# Patient Record
Sex: FEMALE | Race: WHITE | NOT HISPANIC OR LATINO | Employment: OTHER | URBAN - NONMETROPOLITAN AREA
[De-identification: names, ages, dates, MRNs, and addresses within clinical notes are randomized per-mention and may not be internally consistent; named-entity substitution may affect disease eponyms.]

---

## 2023-11-03 ENCOUNTER — TELEPHONE (OUTPATIENT)
Dept: CARDIOLOGY | Facility: CLINIC | Age: 83
End: 2023-11-03
Payer: MEDICARE

## 2023-11-03 PROBLEM — I51.7 LEFT ATRIAL DILATATION: Status: ACTIVE | Noted: 2023-11-03

## 2023-11-03 PROBLEM — I48.0 PAROXYSMAL ATRIAL FIBRILLATION (MULTI): Status: ACTIVE | Noted: 2023-11-03

## 2023-11-03 PROBLEM — I27.20 PULMONARY HYPERTENSION (MULTI): Status: ACTIVE | Noted: 2023-11-03

## 2023-11-03 PROBLEM — I10 ESSENTIAL HYPERTENSION: Status: ACTIVE | Noted: 2023-11-03

## 2023-11-03 PROBLEM — K74.60 LIVER CIRRHOSIS (MULTI): Status: ACTIVE | Noted: 2023-11-03

## 2023-11-03 PROBLEM — N18.32 STAGE 3B CHRONIC KIDNEY DISEASE (MULTI): Status: ACTIVE | Noted: 2023-11-03

## 2023-11-03 PROBLEM — K64.8 INTERNAL HEMORRHOID: Status: ACTIVE | Noted: 2023-11-03

## 2023-11-03 PROBLEM — D64.9 ANEMIA: Status: ACTIVE | Noted: 2023-11-03

## 2023-11-03 RX ORDER — DILTIAZEM HYDROCHLORIDE 120 MG/1
120 CAPSULE, COATED, EXTENDED RELEASE ORAL DAILY
COMMUNITY
Start: 2023-08-08

## 2023-11-03 RX ORDER — FUROSEMIDE 20 MG/1
1 TABLET ORAL DAILY
COMMUNITY
End: 2024-04-01 | Stop reason: SDUPTHER

## 2023-11-03 RX ORDER — DEXTROMETHORPHAN HYDROBROMIDE, GUAIFENESIN 5; 100 MG/5ML; MG/5ML
650 LIQUID ORAL AS NEEDED
COMMUNITY
Start: 2021-11-05

## 2023-11-03 RX ORDER — POTASSIUM CHLORIDE 750 MG/1
1 CAPSULE, EXTENDED RELEASE ORAL DAILY
COMMUNITY
Start: 2022-02-20 | End: 2024-04-01 | Stop reason: SDUPTHER

## 2023-11-03 RX ORDER — CALCIUM CARBONATE/VITAMIN D3 600MG-5MCG
1 TABLET ORAL 2 TIMES DAILY
COMMUNITY
Start: 2021-11-05

## 2023-11-03 RX ORDER — OMEPRAZOLE 20 MG/1
TABLET, DELAYED RELEASE ORAL
COMMUNITY
Start: 2021-11-05

## 2023-11-03 RX ORDER — DILTIAZEM HYDROCHLORIDE 180 MG/1
CAPSULE, COATED, EXTENDED RELEASE ORAL
COMMUNITY
Start: 2023-10-31 | End: 2024-02-22

## 2023-11-03 NOTE — TELEPHONE ENCOUNTER
Patient phoned in left message stating was having elevated bp and pcp changed medication to 180. She states that its to much and doesn't want to run into a problem like last time. Phoned patient went to , tried to leave a  phone disconnected. Phoned patient back went to  left  to return call.

## 2023-11-17 NOTE — TELEPHONE ENCOUNTER
Patient contacted. States her family doctor Dr. Bill increased the Cardizem CD to 180 mg for her blood pressure. States she just started the increase today.    She will monitor blood pressure and call in one week with update.

## 2024-02-22 ENCOUNTER — OFFICE VISIT (OUTPATIENT)
Dept: CARDIOLOGY | Facility: CLINIC | Age: 84
End: 2024-02-22
Payer: MEDICARE

## 2024-02-22 VITALS
DIASTOLIC BLOOD PRESSURE: 50 MMHG | HEART RATE: 95 BPM | HEIGHT: 59 IN | BODY MASS INDEX: 24.8 KG/M2 | SYSTOLIC BLOOD PRESSURE: 142 MMHG | WEIGHT: 123 LBS

## 2024-02-22 DIAGNOSIS — I10 ESSENTIAL HYPERTENSION: ICD-10-CM

## 2024-02-22 DIAGNOSIS — I51.7 LEFT ATRIAL DILATATION: ICD-10-CM

## 2024-02-22 DIAGNOSIS — I48.0 PAROXYSMAL ATRIAL FIBRILLATION (MULTI): ICD-10-CM

## 2024-02-22 DIAGNOSIS — R06.02 SHORTNESS OF BREATH: ICD-10-CM

## 2024-02-22 DIAGNOSIS — N18.32 STAGE 3B CHRONIC KIDNEY DISEASE (MULTI): ICD-10-CM

## 2024-02-22 DIAGNOSIS — I27.20 PULMONARY HYPERTENSION (MULTI): ICD-10-CM

## 2024-02-22 DIAGNOSIS — I49.3 PVC (PREMATURE VENTRICULAR CONTRACTION): ICD-10-CM

## 2024-02-22 LAB
Lab: PRESENT
NON-UH HIE AGAP: 11 MEQ/L (ref 6–16)
NON-UH HIE ANISOCYTOSIS: PRESENT
NON-UH HIE BUN/CREAT RATIO: 18 NO UNITS (ref 10–20)
NON-UH HIE BUN: 16 MG/DL (ref 5–21)
NON-UH HIE CALCIUM LVL: 9.3 MG/DL (ref 8.9–11.1)
NON-UH HIE CHLORIDE: 107 MMOL/L (ref 101–111)
NON-UH HIE CO2: 24 MMOL/L (ref 21–31)
NON-UH HIE CREATININE: 0.9 MG/DL (ref 0.5–1.3)
NON-UH HIE EGFR: 62 ML/MIN/1.73 M2
NON-UH HIE GLUCOSE LVL: 129 MG/DL (ref 55–199)
NON-UH HIE HCT: 25 % (ref 34–46)
NON-UH HIE HGB: 7.6 GM/DL (ref 12–16)
NON-UH HIE HYPOCHROMASIA: PRESENT
NON-UH HIE MCH: 21.1 PG (ref 27–34)
NON-UH HIE MCHC: 30.7 GM/DL (ref 31.4–36)
NON-UH HIE MCV: 68.5 FL (ref 80–100)
NON-UH HIE MPV: 6.9 FL (ref 6.4–10.8)
NON-UH HIE PLATELET: 358 E9/L (ref 150–500)
NON-UH HIE POTASSIUM LVL: 3.7 MMOL/L (ref 3.5–5.3)
NON-UH HIE RBC MORPH: NORMAL
NON-UH HIE RBC: 3.6 E12/L (ref 4.3–5.9)
NON-UH HIE RDW: 16.4 % (ref 10.9–14.2)
NON-UH HIE SODIUM LVL: 138 MMOL/L (ref 135–145)
NON-UH HIE WBC: 5.1 E9/L (ref 4–11)

## 2024-02-22 PROCEDURE — 1159F MED LIST DOCD IN RCRD: CPT | Performed by: INTERNAL MEDICINE

## 2024-02-22 PROCEDURE — 93000 ELECTROCARDIOGRAM COMPLETE: CPT | Performed by: INTERNAL MEDICINE

## 2024-02-22 PROCEDURE — 1036F TOBACCO NON-USER: CPT | Performed by: INTERNAL MEDICINE

## 2024-02-22 PROCEDURE — 3078F DIAST BP <80 MM HG: CPT | Performed by: INTERNAL MEDICINE

## 2024-02-22 PROCEDURE — 3077F SYST BP >= 140 MM HG: CPT | Performed by: INTERNAL MEDICINE

## 2024-02-22 PROCEDURE — 99214 OFFICE O/P EST MOD 30 MIN: CPT | Performed by: INTERNAL MEDICINE

## 2024-02-22 PROCEDURE — 1126F AMNT PAIN NOTED NONE PRSNT: CPT | Performed by: INTERNAL MEDICINE

## 2024-02-22 ASSESSMENT — ENCOUNTER SYMPTOMS
SHORTNESS OF BREATH: 1
PALPITATIONS: 1
DIZZINESS: 1

## 2024-02-22 NOTE — PROGRESS NOTES
"Subjective   Flaca Woody is a 83 y.o. female       Chief Complaint    Follow-up          HPI   Patient is in the office for follow-up for the problems noted below.  She has had no indication of recurrent atrial fibrillation.  She complains of feeling heavy in her legs even though she has excellent circulation and no edema.  It is likely neuropathy in nature.  EKG confirmed normal sinus rhythm with PACs.  She has occasional dyspnea which I believe is related to pulmonary hypertension which is severe.  Last echocardiogram January 2023.  The patient has no significant change in her weight, no orthopnea PND or lower extremity edema and no palpitations.  She has not been falling down and has had no bleeding issues.    Assessment/recommendations:     1-history of atrial fibrillation status post radiofrequency ablation January 13,2022 at [facility]. She was kept off of the anticoagulants due to significant GI bleed history dno indication of recurrent atrial fibrillation.  2-severe pulmonary hypertension, RVSP 65 mmHg by echocardiogram January 2023. We will continue diuretics with potassium and add diltiazem 120 mg daily. This is well-tolerated for the most part  4-increased left atrial size 4.7 cm based on the Echocardiogram January 2023, this increases the risk of recurrent atrial fibrillation  5-controlled hypertension. Currently only on diltiazem  6-nuclear stress test July 2020 was normal  7-stage III chronic kidney disease, will monitor renal function  Review of Systems   Cardiovascular:  Positive for palpitations.   Respiratory:  Positive for shortness of breath.    Neurological:  Positive for dizziness.            Vitals:    02/22/24 1127   BP: 142/50   BP Location: Right arm   Patient Position: Sitting   Pulse: 95   Weight: 55.8 kg (123 lb)   Height: 1.499 m (4' 11\")          EKG done in office today    Objective   Physical Exam  Constitutional:       Appearance: Normal appearance. She is normal weight. "   HENT:      Nose: Nose normal.   Neck:      Vascular: No carotid bruit.   Cardiovascular:      Rate and Rhythm: Normal rate.      Pulses: Normal pulses.      Heart sounds: Murmur heard.      Systolic murmur is present with a grade of 2/6.      Comments: Tricuspid regurg murmur  Pulmonary:      Effort: Pulmonary effort is normal.   Abdominal:      General: Bowel sounds are normal.      Palpations: Abdomen is soft.   Genitourinary:     Rectum: Normal.   Musculoskeletal:         General: Normal range of motion.      Cervical back: Normal range of motion.      Right lower leg: No edema.      Left lower leg: No edema.   Skin:     General: Skin is warm and dry.   Neurological:      General: No focal deficit present.      Mental Status: She is alert.   Psychiatric:         Mood and Affect: Mood normal.         Behavior: Behavior normal.         Thought Content: Thought content normal.         Judgment: Judgment normal.         Allergies  Coricidin, Penicillins, and Spironolactone     Current Medications    Current Outpatient Medications:     acetaminophen (Tylenol 8 HOUR) 650 mg ER tablet, Take 1 tablet (650 mg) by mouth if needed., Disp: , Rfl:     calcium carbonate-vitamin D3 600 mg-5 mcg (200 unit) tablet, Take 1 tablet by mouth 2 times a day., Disp: , Rfl:     dilTIAZem CD (Cardizem CD) 120 mg 24 hr capsule, Take 1 capsule (120 mg) by mouth once daily., Disp: , Rfl:     furosemide (Lasix) 20 mg tablet, Take 1 tablet (20 mg) by mouth once daily., Disp: , Rfl:     omeprazole OTC (PriLOSEC OTC) 20 mg EC tablet, Take by mouth., Disp: , Rfl:     potassium chloride ER (Micro-K) 10 mEq ER capsule, Take 1 capsule (10 mEq) by mouth once daily., Disp: , Rfl:              No medication list or bottles at visit today. Updated verbally with patient.           Assessment/Plan   1. Paroxysmal atrial fibrillation (CMS/HCC)  Follow Up In Cardiology    ECG 12 Lead    Basic Metabolic Panel    CBC      2. PVC (premature ventricular  contraction)  Follow Up In Cardiology    ECG 12 Lead      3. Essential hypertension  Follow Up In Cardiology    Basic Metabolic Panel      4. Pulmonary hypertension (CMS/HCC)        5. Left atrial dilatation        6. Stage 3b chronic kidney disease (CMS/HCC)        7. Shortness of breath  Basic Metabolic Panel    CBC               Scribe Attestation  By signing my name below, Igloria Scribe   attest that this documentation has been prepared under the direction and in the presence of Jenny Moreno MD.     Provider Attestation - Scribe documentation    All medical record entries made by the Scribe were at my direction and personally dictated by me. I have reviewed the chart and agree that the record accurately reflects my personal performance of the history, physical exam, discussion and plan.

## 2024-02-22 NOTE — PATIENT INSTRUCTIONS
Please bring all medicines, vitamins, and herbal supplements with you when you come to the office.    Prescriptions will not be filled unless you are compliant with your follow up appointments or have a follow up appointment scheduled as per instruction of your physician. Refills should be requested at the time of your visit.    Follow up 6 months   Lab work

## 2024-02-22 NOTE — LETTER
February 22, 2024     Hamlet Bill MD  [address]  [facility] [address]    Patient: Flaca Woody   YOB: 1940   Date of Visit: 2/22/2024       Dear Dr. Hamlet Bill MD:    Thank you for referring Flaca Woody to me for evaluation. Below are my notes for this consultation.  If you have questions, please do not hesitate to call me. I look forward to following your patient along with you.       Sincerely,     Jenny Moreno MD      CC: No Recipients  ______________________________________________________________________________________    Subjective   Flaca Woody is a 83 y.o. female       Chief Complaint    Follow-up          HPI   Patient is in the office for follow-up for the problems noted below.  She has had no indication of recurrent atrial fibrillation.  She complains of feeling heavy in her legs even though she has excellent circulation and no edema.  It is likely neuropathy in nature.  EKG confirmed normal sinus rhythm with PACs.  She has occasional dyspnea which I believe is related to pulmonary hypertension which is severe.  Last echocardiogram January 2023.  The patient has no significant change in her weight, no orthopnea PND or lower extremity edema and no palpitations.  She has not been falling down and has had no bleeding issues.    Assessment/recommendations:     1-history of atrial fibrillation status post radiofrequency ablation January 13,2022 at [facility]. She was kept off of the anticoagulants due to significant GI bleed history dno indication of recurrent atrial fibrillation.  2-severe pulmonary hypertension, RVSP 65 mmHg by echocardiogram January 2023. We will continue diuretics with potassium and add diltiazem 120 mg daily. This is well-tolerated for the most part  4-increased left atrial size 4.7 cm based on the Echocardiogram January 2023, this increases the risk of recurrent atrial fibrillation  5-controlled hypertension. Currently  "only on diltiazem  6-nuclear stress test July 2020 was normal  7-stage III chronic kidney disease, will monitor renal function  Review of Systems   Cardiovascular:  Positive for palpitations.   Respiratory:  Positive for shortness of breath.    Neurological:  Positive for dizziness.            Vitals:    02/22/24 1127   BP: 142/50   BP Location: Right arm   Patient Position: Sitting   Pulse: 95   Weight: 55.8 kg (123 lb)   Height: 1.499 m (4' 11\")          EKG done in office today    Objective   Physical Exam  Constitutional:       Appearance: Normal appearance. She is normal weight.   HENT:      Nose: Nose normal.   Neck:      Vascular: No carotid bruit.   Cardiovascular:      Rate and Rhythm: Normal rate.      Pulses: Normal pulses.      Heart sounds: Murmur heard.      Systolic murmur is present with a grade of 2/6.      Comments: Tricuspid regurg murmur  Pulmonary:      Effort: Pulmonary effort is normal.   Abdominal:      General: Bowel sounds are normal.      Palpations: Abdomen is soft.   Genitourinary:     Rectum: Normal.   Musculoskeletal:         General: Normal range of motion.      Cervical back: Normal range of motion.      Right lower leg: No edema.      Left lower leg: No edema.   Skin:     General: Skin is warm and dry.   Neurological:      General: No focal deficit present.      Mental Status: She is alert.   Psychiatric:         Mood and Affect: Mood normal.         Behavior: Behavior normal.         Thought Content: Thought content normal.         Judgment: Judgment normal.         Allergies  Coricidin, Penicillins, and Spironolactone     Current Medications    Current Outpatient Medications:   •  acetaminophen (Tylenol 8 HOUR) 650 mg ER tablet, Take 1 tablet (650 mg) by mouth if needed., Disp: , Rfl:   •  calcium carbonate-vitamin D3 600 mg-5 mcg (200 unit) tablet, Take 1 tablet by mouth 2 times a day., Disp: , Rfl:   •  dilTIAZem CD (Cardizem CD) 120 mg 24 hr capsule, Take 1 capsule (120 mg) by " mouth once daily., Disp: , Rfl:   •  furosemide (Lasix) 20 mg tablet, Take 1 tablet (20 mg) by mouth once daily., Disp: , Rfl:   •  omeprazole OTC (PriLOSEC OTC) 20 mg EC tablet, Take by mouth., Disp: , Rfl:   •  potassium chloride ER (Micro-K) 10 mEq ER capsule, Take 1 capsule (10 mEq) by mouth once daily., Disp: , Rfl:              No medication list or bottles at visit today. Updated verbally with patient.           Assessment/Plan   1. Paroxysmal atrial fibrillation (CMS/HCC)  Follow Up In Cardiology    ECG 12 Lead    Basic Metabolic Panel    CBC      2. PVC (premature ventricular contraction)  Follow Up In Cardiology    ECG 12 Lead      3. Essential hypertension  Follow Up In Cardiology    Basic Metabolic Panel      4. Pulmonary hypertension (CMS/HCC)        5. Left atrial dilatation        6. Stage 3b chronic kidney disease (CMS/HCC)        7. Shortness of breath  Basic Metabolic Panel    CBC               Scribe Attestation  By signing my name below, IanarleticlLucas terry   attest that this documentation has been prepared under the direction and in the presence of Jenny Moreno MD.     Provider Attestation - Scribe documentation    All medical record entries made by the Scribe were at my direction and personally dictated by me. I have reviewed the chart and agree that the record accurately reflects my personal performance of the history, physical exam, discussion and plan.

## 2024-02-26 ENCOUNTER — TELEPHONE (OUTPATIENT)
Dept: CARDIOLOGY | Facility: CLINIC | Age: 84
End: 2024-02-26
Payer: MEDICARE

## 2024-02-26 NOTE — TELEPHONE ENCOUNTER
Result Communication    Resulted Orders   [facility] H[facility] BMP   Result Value Ref Range    [facility] GLUCOSE  55 - 199 mg/dL    [facility] BUN 16 5 - 21 mg/dL    [facility] CREATININE 0.9 0.5 - 1.3 mg/dL    [facility] BUN/CREAT RATIO 18 10 - 20 No Units    [facility] H[facility] CALCIUM LVL 9.3 8.9 - 11.1 mg/dL    [facility] H[facility] SODIUM  135 - 145 mmol/L    [facility] H[facility] POTASSIUM LVL 3.7 3.5 - 5.3 mmol/L    [facility] H[facility] CHLORIDE 107 101 - 111 mmol/L    [facility] H[facility] CO2 24 21 - 31 mmol/L    [facility] H[facility] AGAP 11 6 - 16 mEq/L   [facility] HIE eGFR   Result Value Ref Range    [facility] HIE EGFR 62 >=59 mL/min/1.73 m2   [facility] HIE CBC w/Indices   Result Value Ref Range    [facility] HIE WBC 5.1 4.0 - 11.0 E9/L    [facility] H[facility] RBC 3.6 (L) 4.3 - 5.9 E12/L    [facility] H[facility] HGB 7.6 (L) 12.0 - 16.0 gm/dL    [facility] H[facility] HCT 25.0 (L) 34.0 - 46.0 %    [facility] H[facility] RDW 16.4 (H) 10.9 - 14.2 %    [facility] H[facility] MCH 21.1 (L) 27.0 - 34.0 pg    [facility] H[facility] MCHC 30.7 (L) 31.4 - 36.0 gm/dL    [facility] HIE MCV 68.5 (L) 80.0 - 100.0 fL    [facility] HIE MPV 6.9 6.4 - 10.8 fL    [facility] HIE PLATELET 358.0 150.0 - 500.0 E9/L       4:08 PM      Results were successfully communicated with the patient and they acknowledged their understanding. She will contact Dr. Bill office in the morning.

## 2024-02-26 NOTE — TELEPHONE ENCOUNTER
----- Message from Jenny Moreno MD sent at 2/26/2024  1:45 PM EST -----  Let her know she has severe anemia which need to be addressed as soon as possible by her family physician  ----- Message -----  From: Sharri Garcia - Lab Results In  Sent: 2/22/2024   3:14 PM EST  To: Jenny Moreno MD

## 2024-03-05 ENCOUNTER — TELEPHONE (OUTPATIENT)
Dept: CARDIOLOGY | Facility: CLINIC | Age: 84
End: 2024-03-05
Payer: MEDICARE

## 2024-03-05 NOTE — TELEPHONE ENCOUNTER
----- Message from Jenny Moreno MD sent at 3/4/2024  5:12 PM EST -----  Patient was advised to see her PCP to address the issue of anemia  ----- Message -----  From: Sharri Garcia - Lab Results In  Sent: 2/22/2024   3:14 PM EST  To: Jenny Moreno MD

## 2024-03-05 NOTE — TELEPHONE ENCOUNTER
Spoke with VANESSA mccracken she reports she did follow up with PCP dr. Bill in regard[place] to these levels and he has placed her on iron supplementation. Thank you

## 2024-04-01 DIAGNOSIS — I27.20 PULMONARY HYPERTENSION (MULTI): ICD-10-CM

## 2024-04-01 RX ORDER — FUROSEMIDE 20 MG/1
20 TABLET ORAL DAILY
Qty: 90 TABLET | Refills: 3 | Status: SHIPPED | OUTPATIENT
Start: 2024-04-01 | End: 2025-04-01

## 2024-04-01 RX ORDER — POTASSIUM CHLORIDE 750 MG/1
10 CAPSULE, EXTENDED RELEASE ORAL DAILY
Qty: 90 CAPSULE | Refills: 3 | Status: SHIPPED | OUTPATIENT
Start: 2024-04-01 | End: 2025-04-01

## 2024-07-08 ENCOUNTER — TELEPHONE (OUTPATIENT)
Dept: CARDIOLOGY | Facility: CLINIC | Age: 84
End: 2024-07-08
Payer: MEDICARE

## 2024-07-08 NOTE — TELEPHONE ENCOUNTER
Patient called requesting to be see by Dr. Jenny Moreno MD in [place] because she cannot drive to [place]. Please advise.

## 2024-07-08 NOTE — TELEPHONE ENCOUNTER
Patient was seen at [facility] and per Dr Moreno he would like her added onto his schedule on 7/19. I called patient via the  services and left message for the patient with the date and time.

## 2024-07-19 ENCOUNTER — APPOINTMENT (OUTPATIENT)
Dept: CARDIOLOGY | Facility: CLINIC | Age: 84
End: 2024-07-19
Payer: MEDICARE

## 2024-08-15 ENCOUNTER — TELEPHONE (OUTPATIENT)
Dept: CARDIOLOGY | Facility: CLINIC | Age: 84
End: 2024-08-15
Payer: MEDICARE

## 2024-08-15 NOTE — TELEPHONE ENCOUNTER
Spoke to patient's daughter today in regards to rescheduling 9/3 visit with Dr. Jenny Moreno MD due to HMI being out of office that week. New appointment made for 9/30. This was originally for a 6 month follow up, but daughter advised this patient was in [facility] in July and needed a [place] visit scheduled sooner. I sent to nursing to contact daughter.

## 2024-09-03 ENCOUNTER — APPOINTMENT (OUTPATIENT)
Dept: CARDIOLOGY | Facility: CLINIC | Age: 84
End: 2024-09-03
Payer: MEDICARE

## 2024-09-30 ENCOUNTER — APPOINTMENT (OUTPATIENT)
Dept: CARDIOLOGY | Facility: CLINIC | Age: 84
End: 2024-09-30
Payer: MEDICARE

## 2024-09-30 VITALS
HEIGHT: 59 IN | HEART RATE: 64 BPM | BODY MASS INDEX: 24.6 KG/M2 | DIASTOLIC BLOOD PRESSURE: 68 MMHG | WEIGHT: 122 LBS | SYSTOLIC BLOOD PRESSURE: 120 MMHG

## 2024-09-30 DIAGNOSIS — I51.7 LEFT ATRIAL DILATATION: ICD-10-CM

## 2024-09-30 DIAGNOSIS — I48.0 PAROXYSMAL ATRIAL FIBRILLATION (MULTI): ICD-10-CM

## 2024-09-30 DIAGNOSIS — N18.32 STAGE 3B CHRONIC KIDNEY DISEASE (MULTI): ICD-10-CM

## 2024-09-30 DIAGNOSIS — I10 ESSENTIAL HYPERTENSION: ICD-10-CM

## 2024-09-30 DIAGNOSIS — I25.10 CORONARY ARTERY DISEASE INVOLVING NATIVE HEART, UNSPECIFIED VESSEL OR LESION TYPE, UNSPECIFIED WHETHER ANGINA PRESENT: ICD-10-CM

## 2024-09-30 DIAGNOSIS — I49.3 PVC (PREMATURE VENTRICULAR CONTRACTION): ICD-10-CM

## 2024-09-30 DIAGNOSIS — I27.20 PULMONARY HYPERTENSION (MULTI): ICD-10-CM

## 2024-09-30 DIAGNOSIS — E78.5 HYPERLIPIDEMIA, UNSPECIFIED HYPERLIPIDEMIA TYPE: ICD-10-CM

## 2024-09-30 DIAGNOSIS — Z87.891 FORMER SMOKER: ICD-10-CM

## 2024-09-30 PROBLEM — R07.9 CHEST PAIN: Status: RESOLVED | Noted: 2024-09-30 | Resolved: 2024-09-30

## 2024-09-30 PROBLEM — R07.9 CHEST PAIN: Status: ACTIVE | Noted: 2024-09-30

## 2024-09-30 PROCEDURE — 3074F SYST BP LT 130 MM HG: CPT | Performed by: INTERNAL MEDICINE

## 2024-09-30 PROCEDURE — 1159F MED LIST DOCD IN RCRD: CPT | Performed by: INTERNAL MEDICINE

## 2024-09-30 PROCEDURE — 1036F TOBACCO NON-USER: CPT | Performed by: INTERNAL MEDICINE

## 2024-09-30 PROCEDURE — 99214 OFFICE O/P EST MOD 30 MIN: CPT | Performed by: INTERNAL MEDICINE

## 2024-09-30 PROCEDURE — 3078F DIAST BP <80 MM HG: CPT | Performed by: INTERNAL MEDICINE

## 2024-09-30 RX ORDER — NITROGLYCERIN 0.4 MG/1
0.4 TABLET SUBLINGUAL EVERY 5 MIN PRN
Qty: 25 TABLET | Refills: 11 | Status: SHIPPED | OUTPATIENT
Start: 2024-09-30 | End: 2024-10-30

## 2024-09-30 RX ORDER — CLOPIDOGREL BISULFATE 75 MG/1
75 TABLET, FILM COATED ORAL
COMMUNITY
Start: 2024-07-06

## 2024-09-30 RX ORDER — ATORVASTATIN CALCIUM 20 MG/1
20 TABLET, FILM COATED ORAL DAILY
Qty: 90 TABLET | Refills: 3 | Status: SHIPPED | OUTPATIENT
Start: 2024-09-30 | End: 2025-09-30

## 2024-09-30 RX ORDER — ATORVASTATIN CALCIUM 40 MG/1
40 TABLET, FILM COATED ORAL DAILY
COMMUNITY
Start: 2024-07-06 | End: 2024-09-30 | Stop reason: DRUGHIGH

## 2024-09-30 ASSESSMENT — ENCOUNTER SYMPTOMS
IRREGULAR HEARTBEAT: 1
SHORTNESS OF BREATH: 1
LIGHT-HEADEDNESS: 1

## 2024-09-30 NOTE — PATIENT INSTRUCTIONS
Please bring all medicines, vitamins, and herbal supplements with you when you come to the office.    Prescriptions will not be filled unless you are compliant with your follow up appointments or have a follow up appointment scheduled as per instruction of your physician. Refills should be requested at the time of your visit.     Reduce Lipitor  Remain off Imdur  Follow up

## 2024-09-30 NOTE — LETTER
September 30, 2024     Hamlet Bill MD  [address]  [facility] [address]    Patient: Flaca Woody   YOB: 1940   Date of Visit: 9/30/2024       Dear Dr. Hamlet Bill MD:    Thank you for referring Flaca Woody to me for evaluation. Below are my notes for this consultation.  If you have questions, please do not hesitate to call me. I look forward to following your patient along with you.       Sincerely,     Jenny Moreno MD      CC: No Recipients  ______________________________________________________________________________________    Subjective   Flaca Woody is a 84 y.o. female       Chief Complaint    Follow-up          HPI   Patient is in the office for follow-up for the problems noted below.  In July 2024 she was admitted to [facility] with ACS and her cardiac catheterization done by myself revealed just under 50% stenosis in the anterior descending artery.  She was placed on long-acting nitrates and Plavix along with atorvastatin.  She stopped the nitrates because of headache and also stop the Plavix and the Lipitor for nonspecific complaints that were not related to side effect of these 2 medications.  Fortunately she has not had any symptoms of angina pectoris.  She has no indication of recurrent atrial fibrillation.  Her blood pressure is under control.    Assessment/recommendations:     1-history of atrial fibrillation status post radiofrequency ablation January 13, 2022 at [facility]. She was kept off of the anticoagulants due to significant GI bleed history dno indication of recurrent atrial fibrillation.  2-severe pulmonary hypertension, RVSP 65 mmHg by echocardiogram January 2023. We will continue diuretics with potassium and  diltiazem 120 mg daily.   4-increased left atrial size 4.7 cm based on the Echocardiogram January 2023, this increases the risk of recurrent atrial fibrillation  5-coronary artery disease with 45-50% stenosis the mid  "anterior descending artery based on cardiac catheterization July 2024 at [facility] manage medically.  The patient was reeducated about treatment of CAD and she is willing to go back on the Plavix and reduced dose atorvastatin down to 20 mg daily.  Sublingual nitroglycerin was provided  6-essential hypertension, currently under control.    Review of Systems   Cardiovascular:  Positive for irregular heartbeat.   Respiratory:  Positive for shortness of breath.    Neurological:  Positive for light-headedness.   All other systems reviewed and are negative.           Vitals:    09/30/24 1028   BP: 120/68   BP Location: Left arm   Patient Position: Sitting   Pulse: 64   Weight: 55.3 kg (122 lb)   Height: 1.499 m (4' 11\")        Objective   Physical Exam  Constitutional:       Appearance: Normal appearance.   HENT:      Nose: Nose normal.   Neck:      Vascular: No carotid bruit.   Cardiovascular:      Rate and Rhythm: Normal rate.      Pulses: Normal pulses.      Heart sounds: Normal heart sounds.   Pulmonary:      Effort: Pulmonary effort is normal.   Abdominal:      General: Bowel sounds are normal.      Palpations: Abdomen is soft.   Musculoskeletal:         General: Normal range of motion.      Cervical back: Normal range of motion.      Right lower leg: No edema.      Left lower leg: No edema.   Skin:     General: Skin is warm and dry.   Neurological:      General: No focal deficit present.      Mental Status: She is alert.   Psychiatric:         Mood and Affect: Mood normal.         Behavior: Behavior normal.         Thought Content: Thought content normal.         Judgment: Judgment normal.         Allergies  Coricidin, Isosorbide mononitrate, Penicillins, and Spironolactone     Current Medications    Current Outpatient Medications:   •  acetaminophen (Tylenol 8 HOUR) 650 mg ER tablet, Take 1 tablet (650 mg) by mouth if needed., Disp: , Rfl:   •  calcium carbonate-vitamin D3 600 mg-5 mcg (200 unit) " tablet, Take 1 tablet by mouth 2 times a day., Disp: , Rfl:   •  dilTIAZem CD (Cardizem CD) 120 mg 24 hr capsule, Take 1 capsule (120 mg) by mouth once daily., Disp: , Rfl:   •  furosemide (Lasix) 20 mg tablet, Take 1 tablet (20 mg) by mouth once daily., Disp: 90 tablet, Rfl: 3  •  omeprazole OTC (PriLOSEC OTC) 20 mg EC tablet, Take by mouth., Disp: , Rfl:   •  Plavix 75 mg tablet, Take 1 tablet (75 mg) by mouth once daily., Disp: , Rfl:   •  potassium chloride ER (Micro-K) 10 mEq ER capsule, Take 1 capsule (10 mEq) by mouth once daily., Disp: 90 capsule, Rfl: 3  •  atorvastatin (Lipitor) 20 mg tablet, Take 1 tablet (20 mg) by mouth once daily., Disp: 90 tablet, Rfl: 3  •  nitroglycerin (Nitrostat) 0.4 mg SL tablet, Place 1 tablet (0.4 mg) under the tongue every 5 minutes if needed for chest pain. May repeat dose every 5 minutes for up to 3 doses total., Disp: 25 tablet, Rfl: 11                     Assessment/Plan   1. Coronary artery disease involving native heart, unspecified vessel or lesion type, unspecified whether angina present  nitroglycerin (Nitrostat) 0.4 mg SL tablet      2. Paroxysmal atrial fibrillation (Multi)  Follow Up In Cardiology      3. PVC (premature ventricular contraction)  Follow Up In Cardiology      4. Essential hypertension  Follow Up In Cardiology    Follow Up In Cardiology      5. Left atrial dilatation        6. Pulmonary hypertension (Multi)        7. Hyperlipidemia, unspecified hyperlipidemia type  atorvastatin (Lipitor) 20 mg tablet      8. Stage 3b chronic kidney disease (Multi)        9. Former smoker        10. BMI 24.0-24.9, adult                 Scribe Attestation  By signing my name below, IMarisol LPN, Scribe   attest that this documentation has been prepared under the direction and in the presence of Jenny Moreno MD.     Provider Attestation - Scribe documentation    All medical record entries made by the Scribe were at my direction and personally dictated by me. I  have reviewed the chart and agree that the record accurately reflects my personal performance of the history, physical exam, discussion and plan.

## 2024-09-30 NOTE — PROGRESS NOTES
Subjective   Flaca Woody is a 84 y.o. female       Chief Complaint    Follow-up          HPI   Patient is in the office for follow-up for the problems noted below.  In July 2024 she was admitted to [facility] with ACS and her cardiac catheterization done by myself revealed just under 50% stenosis in the anterior descending artery.  She was placed on long-acting nitrates and Plavix along with atorvastatin.  She stopped the nitrates because of headache and also stop the Plavix and the Lipitor for nonspecific complaints that were not related to side effect of these 2 medications.  Fortunately she has not had any symptoms of angina pectoris.  She has no indication of recurrent atrial fibrillation.  Her blood pressure is under control.    Assessment/recommendations:     1-history of atrial fibrillation status post radiofrequency ablation January 13, 2022 at [facility]. She was kept off of the anticoagulants due to significant GI bleed history dno indication of recurrent atrial fibrillation.  2-severe pulmonary hypertension, RVSP 65 mmHg by echocardiogram January 2023. We will continue diuretics with potassium and  diltiazem 120 mg daily.   4-increased left atrial size 4.7 cm based on the Echocardiogram January 2023, this increases the risk of recurrent atrial fibrillation  5-coronary artery disease with 45-50% stenosis the mid anterior descending artery based on cardiac catheterization July 2024 at [facility] manage medically.  The patient was reeducated about treatment of CAD and she is willing to go back on the Plavix and reduced dose atorvastatin down to 20 mg daily.  Sublingual nitroglycerin was provided  6-essential hypertension, currently under control.    Review of Systems   Cardiovascular:  Positive for irregular heartbeat.   Respiratory:  Positive for shortness of breath.    Neurological:  Positive for light-headedness.   All other systems reviewed and are negative.           Vitals:  "   09/30/24 1028   BP: 120/68   BP Location: Left arm   Patient Position: Sitting   Pulse: 64   Weight: 55.3 kg (122 lb)   Height: 1.499 m (4' 11\")        Objective   Physical Exam  Constitutional:       Appearance: Normal appearance.   HENT:      Nose: Nose normal.   Neck:      Vascular: No carotid bruit.   Cardiovascular:      Rate and Rhythm: Normal rate.      Pulses: Normal pulses.      Heart sounds: Normal heart sounds.   Pulmonary:      Effort: Pulmonary effort is normal.   Abdominal:      General: Bowel sounds are normal.      Palpations: Abdomen is soft.   Musculoskeletal:         General: Normal range of motion.      Cervical back: Normal range of motion.      Right lower leg: No edema.      Left lower leg: No edema.   Skin:     General: Skin is warm and dry.   Neurological:      General: No focal deficit present.      Mental Status: She is alert.   Psychiatric:         Mood and Affect: Mood normal.         Behavior: Behavior normal.         Thought Content: Thought content normal.         Judgment: Judgment normal.         Allergies  Coricidin, Isosorbide mononitrate, Penicillins, and Spironolactone     Current Medications    Current Outpatient Medications:     acetaminophen (Tylenol 8 HOUR) 650 mg ER tablet, Take 1 tablet (650 mg) by mouth if needed., Disp: , Rfl:     calcium carbonate-vitamin D3 600 mg-5 mcg (200 unit) tablet, Take 1 tablet by mouth 2 times a day., Disp: , Rfl:     dilTIAZem CD (Cardizem CD) 120 mg 24 hr capsule, Take 1 capsule (120 mg) by mouth once daily., Disp: , Rfl:     furosemide (Lasix) 20 mg tablet, Take 1 tablet (20 mg) by mouth once daily., Disp: 90 tablet, Rfl: 3    omeprazole OTC (PriLOSEC OTC) 20 mg EC tablet, Take by mouth., Disp: , Rfl:     Plavix 75 mg tablet, Take 1 tablet (75 mg) by mouth once daily., Disp: , Rfl:     potassium chloride ER (Micro-K) 10 mEq ER capsule, Take 1 capsule (10 mEq) by mouth once daily., Disp: 90 capsule, Rfl: 3    atorvastatin (Lipitor) 20 mg " tablet, Take 1 tablet (20 mg) by mouth once daily., Disp: 90 tablet, Rfl: 3    nitroglycerin (Nitrostat) 0.4 mg SL tablet, Place 1 tablet (0.4 mg) under the tongue every 5 minutes if needed for chest pain. May repeat dose every 5 minutes for up to 3 doses total., Disp: 25 tablet, Rfl: 11                     Assessment/Plan   1. Coronary artery disease involving native heart, unspecified vessel or lesion type, unspecified whether angina present  nitroglycerin (Nitrostat) 0.4 mg SL tablet      2. Paroxysmal atrial fibrillation (Multi)  Follow Up In Cardiology      3. PVC (premature ventricular contraction)  Follow Up In Cardiology      4. Essential hypertension  Follow Up In Cardiology    Follow Up In Cardiology      5. Left atrial dilatation        6. Pulmonary hypertension (Multi)        7. Hyperlipidemia, unspecified hyperlipidemia type  atorvastatin (Lipitor) 20 mg tablet      8. Stage 3b chronic kidney disease (Multi)        9. Former smoker        10. BMI 24.0-24.9, adult                 Scribe Attestation  By signing my name below, Marisol OH LPN, Scribe   attest that this documentation has been prepared under the direction and in the presence of Jenny Moreno MD.     Provider Attestation - Scribe documentation    All medical record entries made by the Scribe were at my direction and personally dictated by me. I have reviewed the chart and agree that the record accurately reflects my personal performance of the history, physical exam, discussion and plan.

## 2025-02-04 ENCOUNTER — TELEPHONE (OUTPATIENT)
Dept: CARDIOLOGY | Facility: CLINIC | Age: 85
End: 2025-02-04
Payer: MEDICARE

## 2025-02-04 NOTE — TELEPHONE ENCOUNTER
Patient left vm via the nurse line reports she has been experiencing shortness of breath and fatigue. Patient was inquiring to be seen for evaluation in the [place] office.     Need more details then we can see about opening with Dr. Jenyn Moreno MD

## 2025-02-04 NOTE — TELEPHONE ENCOUNTER
Phoned patient, she states she has been experiencing shortness of breath and fatigue for a couple of weeks now. She also reports some chest tightness that is a new finding. States she went to take trash out this morning and got extremely short of breath and had a tightness in her chest. Reports bilateral lower extremity edema last thursday but that has since subsided. Pt inquiring on sooner ov in [place] or further recommendations. Please advise    Recommended pt go to ER if symptoms worsen or fail to improve. She verbalized understanding.

## 2025-02-13 PROCEDURE — 93306 TTE W/DOPPLER COMPLETE: CPT | Performed by: INTERNAL MEDICINE

## 2025-03-12 ENCOUNTER — APPOINTMENT (OUTPATIENT)
Dept: CARDIOLOGY | Facility: CLINIC | Age: 85
End: 2025-03-12
Payer: MEDICARE

## 2025-03-12 VITALS
BODY MASS INDEX: 23.99 KG/M2 | HEIGHT: 59 IN | SYSTOLIC BLOOD PRESSURE: 134 MMHG | HEART RATE: 68 BPM | WEIGHT: 119 LBS | DIASTOLIC BLOOD PRESSURE: 60 MMHG

## 2025-03-12 DIAGNOSIS — I25.10 CORONARY ARTERY DISEASE INVOLVING NATIVE HEART, UNSPECIFIED VESSEL OR LESION TYPE, UNSPECIFIED WHETHER ANGINA PRESENT: ICD-10-CM

## 2025-03-12 DIAGNOSIS — E78.5 DYSLIPIDEMIA: ICD-10-CM

## 2025-03-12 DIAGNOSIS — I10 ESSENTIAL HYPERTENSION: ICD-10-CM

## 2025-03-12 DIAGNOSIS — I48.0 PAROXYSMAL ATRIAL FIBRILLATION (MULTI): Primary | ICD-10-CM

## 2025-03-12 DIAGNOSIS — D50.0 IRON DEFICIENCY ANEMIA DUE TO CHRONIC BLOOD LOSS: ICD-10-CM

## 2025-03-12 DIAGNOSIS — R06.02 SHORTNESS OF BREATH: ICD-10-CM

## 2025-03-12 DIAGNOSIS — I27.20 PULMONARY HYPERTENSION (MULTI): ICD-10-CM

## 2025-03-12 DIAGNOSIS — Z87.891 FORMER SMOKER: ICD-10-CM

## 2025-03-12 DIAGNOSIS — N18.32 STAGE 3B CHRONIC KIDNEY DISEASE (MULTI): ICD-10-CM

## 2025-03-12 PROCEDURE — 99214 OFFICE O/P EST MOD 30 MIN: CPT | Performed by: INTERNAL MEDICINE

## 2025-03-12 PROCEDURE — 3075F SYST BP GE 130 - 139MM HG: CPT | Performed by: INTERNAL MEDICINE

## 2025-03-12 PROCEDURE — 1036F TOBACCO NON-USER: CPT | Performed by: INTERNAL MEDICINE

## 2025-03-12 PROCEDURE — 3078F DIAST BP <80 MM HG: CPT | Performed by: INTERNAL MEDICINE

## 2025-03-12 PROCEDURE — 1159F MED LIST DOCD IN RCRD: CPT | Performed by: INTERNAL MEDICINE

## 2025-03-12 RX ORDER — FERROUS SULFATE 325(65) MG
325 TABLET ORAL 2 TIMES DAILY
COMMUNITY
Start: 2025-02-07 | End: 2026-02-11

## 2025-03-12 RX ORDER — CLOPIDOGREL BISULFATE 75 MG/1
TABLET ORAL
Qty: 36 TABLET | Refills: 3 | Status: SHIPPED | OUTPATIENT
Start: 2025-03-12

## 2025-03-12 ASSESSMENT — ENCOUNTER SYMPTOMS
LIGHT-HEADEDNESS: 1
SHORTNESS OF BREATH: 1

## 2025-03-12 NOTE — PROGRESS NOTES
Subjective   Flaca Woody is a 84 y.o. female       Chief Complaint    Follow-up          HPI   Patient is in the office after recent admission to [facility] with severe anemia [facility]  She is here for follow-up after that admission.  She was found to have severe anemia believed to be caused by GI blood loss with negative gastrointestinal evaluation workup.  She was placed on iron supplement after having blood transfusion and her problems were corrected.  She was also found to have severe pulmonary hypertension which is not a new findings based on the echocardiogram during the admission with RVSP over 65 mmHg.  Ejection fraction was normal she was in sinus rhythm.  There has been no indication of recurrent atrial fibrillation.  She is not anticoagulated due to history of GI blood loss.  She had ablation for atrial fibrillation with no recurrent arrhythmias.  She had a cardiac catheterization last summer which I reviewed with her on electronic charts demonstrating the atherosclerosis process.  She is tolerating statin without a problem.  She has been on Plavix 75 mg daily.  Given the problem with her GI bleed and anemia adjustment Plavix dose was made today.    Assessment/recommendations:     1-history of atrial fibrillation status post radiofrequency ablation January 13, 2022 at [facility]. She was kept off of the anticoagulants due to significant GI bleed history ,no indication of recurrent atrial fibrillation.  2-severe pulmonary hypertension, RVSP 75 mmHg by echocardiogram February 2025 at [facility]. We will continue diuretics with potassium and  diltiazem 120 mg daily.  Patient turned down the option of using sildenafil.  She continued to have symptoms of dyspnea.  4-increased left atrial size 4.7 cm based on the Echocardiogram January 2023, this increases the risk of recurrent atrial fibrillation  5-coronary artery disease with 45-50% stenosis the mid  "anterior descending artery based on cardiac catheterization July 2024 at [facility] manage medically.  She has been on Plavix 75 mg daily along with a statin.  Due to recent severe anemia the Plavix dose to be reduced down to 75 mg 3 days weekly.  Regular follow-up on CBC was recommended  6-essential hypertension, currently under control.  7-severe iron deficiency anemia requiring blood transfusion and currently on iron supplements with negative GI workup.  Review of Systems   Cardiovascular:  Positive for chest pain.   Respiratory:  Positive for shortness of breath.    Neurological:  Positive for light-headedness.   All other systems reviewed and are negative.           Vitals:    03/12/25 1422   BP: 134/60   BP Location: Right arm   Patient Position: Sitting   Pulse: 68   Weight: 54 kg (119 lb)   Height: 1.499 m (4' 11\")        Objective   Physical Exam  Constitutional:       Appearance: Normal appearance.   HENT:      Nose: Nose normal.   Neck:      Vascular: No carotid bruit.   Cardiovascular:      Rate and Rhythm: Normal rate.      Pulses: Normal pulses.      Heart sounds: Normal heart sounds.   Pulmonary:      Effort: Pulmonary effort is normal.   Abdominal:      General: Bowel sounds are normal.      Palpations: Abdomen is soft.   Musculoskeletal:         General: Normal range of motion.      Cervical back: Normal range of motion.      Right lower leg: No edema.      Left lower leg: No edema.   Skin:     General: Skin is warm and dry.   Neurological:      General: No focal deficit present.      Mental Status: She is alert.   Psychiatric:         Mood and Affect: Mood normal.         Behavior: Behavior normal.         Thought Content: Thought content normal.         Judgment: Judgment normal.         Allergies  Coricidin, Isosorbide mononitrate, Penicillins, and Spironolactone     Current Medications    Current Outpatient Medications:     acetaminophen (Tylenol 8 HOUR) 650 mg ER tablet, Take 1 " tablet (650 mg) by mouth if needed., Disp: , Rfl:     atorvastatin (Lipitor) 20 mg tablet, Take 1 tablet (20 mg) by mouth once daily., Disp: 90 tablet, Rfl: 3    calcium carbonate-vitamin D3 600 mg-5 mcg (200 unit) tablet, Take 1 tablet by mouth 2 times a day., Disp: , Rfl:     dilTIAZem CD (Cardizem CD) 120 mg 24 hr capsule, Take 1 capsule (120 mg) by mouth once daily., Disp: , Rfl:     ferrous sulfate tablet, Take 1 tablet (325 mg) by mouth twice a day., Disp: , Rfl:     furosemide (Lasix) 20 mg tablet, Take 1 tablet (20 mg) by mouth once daily., Disp: 90 tablet, Rfl: 3    nitroglycerin (Nitrostat) 0.4 mg SL tablet, Place 1 tablet (0.4 mg) under the tongue every 5 minutes if needed for chest pain. May repeat dose every 5 minutes for up to 3 doses total., Disp: 25 tablet, Rfl: 11    omeprazole OTC (PriLOSEC OTC) 20 mg EC tablet, Take by mouth., Disp: , Rfl:     potassium chloride ER (Micro-K) 10 mEq ER capsule, Take 1 capsule (10 mEq) by mouth once daily., Disp: 90 capsule, Rfl: 3    clopidogrel (Plavix) 75 mg tablet, Take one tablet 3 days a week only, Disp: 36 tablet, Rfl: 3                     Assessment/Plan   1. Paroxysmal atrial fibrillation (Multi)        2. Pulmonary hypertension (Multi)        3. Essential hypertension        4. Coronary artery disease involving native heart, unspecified vessel or lesion type, unspecified whether angina present  Follow Up In Cardiology    clopidogrel (Plavix) 75 mg tablet      5. Shortness of breath        6. Stage 3b chronic kidney disease (Multi)        7. BMI 24.0-24.9, adult        8. Former smoker        9. Iron deficiency anemia due to chronic blood loss        10. Dyslipidemia                 Scribe Attestation  By signing my name below, Marisol OH LPN, Scribe   attest that this documentation has been prepared under the direction and in the presence of Jenny Moreno MD.     Provider Attestation - Scribe documentation    All medical record entries made by the  Scribe were at my direction and personally dictated by me. I have reviewed the chart and agree that the record accurately reflects my personal performance of the history, physical exam, discussion and plan.

## 2025-03-12 NOTE — PATIENT INSTRUCTIONS
Please bring all medicines, vitamins, and herbal supplements with you when you come to the office.    Prescriptions will not be filled unless you are compliant with your follow up appointments or have a follow up appointment scheduled as per instruction of your physician. Refills should be requested at the time of your visit.     Plavix one tablet 3 days a week only   Sildenafil discussed. Patient declines  Follow up

## 2025-03-12 NOTE — LETTER
March 12, 2025     Hamlet Bill MD  [id number]  [facility] [id number]    Patient: Flaca Woody   YOB: 1940   Date of Visit: 3/12/2025       Dear Dr. Hamlet Bill MD:    Thank you for referring Flaca Woody to me for evaluation. Below are my notes for this consultation.  If you have questions, please do not hesitate to call me. I look forward to following your patient along with you.       Sincerely,     Jenny Moreno MD      CC: No Recipients  ______________________________________________________________________________________    Subjective   Flaca Woody is a 84 y.o. female       Chief Complaint    Follow-up          HPI   Patient is in the office after recent admission to [facility] with severe anemia [facility]  She is here for follow-up after that admission.  She was found to have severe anemia believed to be caused by GI blood loss with negative gastrointestinal evaluation workup.  She was placed on iron supplement after having blood transfusion and her problems were corrected.  She was also found to have severe pulmonary hypertension which is not a new findings based on the echocardiogram during the admission with RVSP over 65 mmHg.  Ejection fraction was normal she was in sinus rhythm.  There has been no indication of recurrent atrial fibrillation.  She is not anticoagulated due to history of GI blood loss.  She had ablation for atrial fibrillation with no recurrent arrhythmias.  She had a cardiac catheterization last summer which I reviewed with her on electronic charts demonstrating the atherosclerosis process.  She is tolerating statin without a problem.  She has been on Plavix 75 mg daily.  Given the problem with her GI bleed and anemia adjustment Plavix dose was made today.    Assessment/recommendations:     1-history of atrial fibrillation status post radiofrequency ablation January 13, 2022 at [place]  [facility]. She was kept off of the anticoagulants due to significant GI bleed history ,no indication of recurrent atrial fibrillation.  2-severe pulmonary hypertension, RVSP 75 mmHg by echocardiogram February 2025 at [facility]. We will continue diuretics with potassium and  diltiazem 120 mg daily.  Patient turned down the option of using sildenafil.  She continued to have symptoms of dyspnea.  4-increased left atrial size 4.7 cm based on the Echocardiogram January 2023, this increases the risk of recurrent atrial fibrillation  5-coronary artery disease with 45-50% stenosis the mid anterior descending artery based on cardiac catheterization July 2024 at [facility] manage medically.  She has been on Plavix 75 mg daily along with a statin.  Due to recent severe anemia the Plavix dose to be reduced down to 75 mg 3 days weekly.  Regular follow-up on CBC was recommended  6-essential hypertension, currently under control.  7-severe iron deficiency anemia requiring blood transfusion and currently on iron supplements with negative GI workup.  Review of Systems   Cardiovascular:  Positive for chest pain.   Respiratory:  Positive for shortness of breath.    Neurological:  Positive for light-headedness.   All other systems reviewed and are negative.           Vitals:    03/12/25 1422   BP: 134/60   BP Location: Right arm   Patient Position: Sitting   Pulse: 68   Weight: 54 kg (119 lb)   Height: 1.499 m (4' 11\")        Objective   Physical Exam  Constitutional:       Appearance: Normal appearance.   HENT:      Nose: Nose normal.   Neck:      Vascular: No carotid bruit.   Cardiovascular:      Rate and Rhythm: Normal rate.      Pulses: Normal pulses.      Heart sounds: Normal heart sounds.   Pulmonary:      Effort: Pulmonary effort is normal.   Abdominal:      General: Bowel sounds are normal.      Palpations: Abdomen is soft.   Musculoskeletal:         General: Normal range of motion.      Cervical back: Normal " range of motion.      Right lower leg: No edema.      Left lower leg: No edema.   Skin:     General: Skin is warm and dry.   Neurological:      General: No focal deficit present.      Mental Status: She is alert.   Psychiatric:         Mood and Affect: Mood normal.         Behavior: Behavior normal.         Thought Content: Thought content normal.         Judgment: Judgment normal.         Allergies  Coricidin, Isosorbide mononitrate, Penicillins, and Spironolactone     Current Medications    Current Outpatient Medications:   •  acetaminophen (Tylenol 8 HOUR) 650 mg ER tablet, Take 1 tablet (650 mg) by mouth if needed., Disp: , Rfl:   •  atorvastatin (Lipitor) 20 mg tablet, Take 1 tablet (20 mg) by mouth once daily., Disp: 90 tablet, Rfl: 3  •  calcium carbonate-vitamin D3 600 mg-5 mcg (200 unit) tablet, Take 1 tablet by mouth 2 times a day., Disp: , Rfl:   •  dilTIAZem CD (Cardizem CD) 120 mg 24 hr capsule, Take 1 capsule (120 mg) by mouth once daily., Disp: , Rfl:   •  ferrous sulfate tablet, Take 1 tablet (325 mg) by mouth twice a day., Disp: , Rfl:   •  furosemide (Lasix) 20 mg tablet, Take 1 tablet (20 mg) by mouth once daily., Disp: 90 tablet, Rfl: 3  •  nitroglycerin (Nitrostat) 0.4 mg SL tablet, Place 1 tablet (0.4 mg) under the tongue every 5 minutes if needed for chest pain. May repeat dose every 5 minutes for up to 3 doses total., Disp: 25 tablet, Rfl: 11  •  omeprazole OTC (PriLOSEC OTC) 20 mg EC tablet, Take by mouth., Disp: , Rfl:   •  potassium chloride ER (Micro-K) 10 mEq ER capsule, Take 1 capsule (10 mEq) by mouth once daily., Disp: 90 capsule, Rfl: 3  •  clopidogrel (Plavix) 75 mg tablet, Take one tablet 3 days a week only, Disp: 36 tablet, Rfl: 3                     Assessment/Plan   1. Paroxysmal atrial fibrillation (Multi)        2. Pulmonary hypertension (Multi)        3. Essential hypertension        4. Coronary artery disease involving native heart, unspecified vessel or lesion type,  unspecified whether angina present  Follow Up In Cardiology    clopidogrel (Plavix) 75 mg tablet      5. Shortness of breath        6. Stage 3b chronic kidney disease (Multi)        7. BMI 24.0-24.9, adult        8. Former smoker        9. Iron deficiency anemia due to chronic blood loss        10. Dyslipidemia                 Scribe Attestation  By signing my name below, Marisol OH LPN, Scribe   attest that this documentation has been prepared under the direction and in the presence of Jenny Moreno MD.     Provider Attestation - Scribe documentation    All medical record entries made by the Scribe were at my direction and personally dictated by me. I have reviewed the chart and agree that the record accurately reflects my personal performance of the history, physical exam, discussion and plan.

## 2025-03-27 ENCOUNTER — TELEPHONE (OUTPATIENT)
Dept: CARDIOLOGY | Facility: CLINIC | Age: 85
End: 2025-03-27
Payer: MEDICARE

## 2025-03-27 DIAGNOSIS — I25.10 CORONARY ARTERY DISEASE INVOLVING NATIVE HEART, UNSPECIFIED VESSEL OR LESION TYPE, UNSPECIFIED WHETHER ANGINA PRESENT: ICD-10-CM

## 2025-03-27 NOTE — TELEPHONE ENCOUNTER
"Call from patient stating after her last OV, she was to let you know if she has increased bruising, and stated she is having \"a lot of bruising in the back of her legs\", and is \"bleeding more than usual\". Plavix was decreased to 75 mg 3 days a week.   "

## 2025-03-28 DIAGNOSIS — I27.20 PULMONARY HYPERTENSION (MULTI): ICD-10-CM

## 2025-03-28 RX ORDER — CLOPIDOGREL BISULFATE 75 MG/1
75 TABLET ORAL WEEKLY
COMMUNITY

## 2025-03-28 NOTE — TELEPHONE ENCOUNTER
Call to patient, she last took it on Wednesday, and will take again next week Wednesday.  Med list updated.

## 2025-03-31 RX ORDER — POTASSIUM CHLORIDE 750 MG/1
CAPSULE, EXTENDED RELEASE ORAL
Qty: 90 CAPSULE | Refills: 3 | Status: SHIPPED | OUTPATIENT
Start: 2025-03-31

## 2025-03-31 RX ORDER — FUROSEMIDE 20 MG/1
20 TABLET ORAL DAILY
Qty: 90 TABLET | Refills: 3 | Status: SHIPPED | OUTPATIENT
Start: 2025-03-31

## 2025-05-14 ENCOUNTER — APPOINTMENT (OUTPATIENT)
Dept: CARDIOLOGY | Facility: CLINIC | Age: 85
End: 2025-05-14
Payer: MEDICARE

## 2025-05-14 VITALS
SYSTOLIC BLOOD PRESSURE: 124 MMHG | HEIGHT: 59 IN | BODY MASS INDEX: 24.39 KG/M2 | DIASTOLIC BLOOD PRESSURE: 60 MMHG | HEART RATE: 76 BPM | WEIGHT: 121 LBS

## 2025-05-14 DIAGNOSIS — I51.7 LEFT ATRIAL DILATATION: ICD-10-CM

## 2025-05-14 DIAGNOSIS — I49.3 PVC (PREMATURE VENTRICULAR CONTRACTION): ICD-10-CM

## 2025-05-14 DIAGNOSIS — K92.1 HEMATOCHEZIA: ICD-10-CM

## 2025-05-14 DIAGNOSIS — I10 ESSENTIAL HYPERTENSION: ICD-10-CM

## 2025-05-14 DIAGNOSIS — Z87.891 FORMER SMOKER: ICD-10-CM

## 2025-05-14 DIAGNOSIS — I25.10 CORONARY ARTERY DISEASE INVOLVING NATIVE HEART, UNSPECIFIED VESSEL OR LESION TYPE, UNSPECIFIED WHETHER ANGINA PRESENT: ICD-10-CM

## 2025-05-14 DIAGNOSIS — I48.0 PAROXYSMAL ATRIAL FIBRILLATION (MULTI): Primary | ICD-10-CM

## 2025-05-14 DIAGNOSIS — N18.32 STAGE 3B CHRONIC KIDNEY DISEASE (MULTI): ICD-10-CM

## 2025-05-14 DIAGNOSIS — I27.20 PULMONARY HYPERTENSION (MULTI): ICD-10-CM

## 2025-05-14 LAB
NON-UH HIE ANION GAP:SCNC:PT:SER/PLAS:QN:: 9 MEQ/L (ref 6–16)
NON-UH HIE CALCIUM:MCNC:PT:SER/PLAS:QN:: 9.6 MG/DL (ref 8.9–11.1)
NON-UH HIE CARBON DIOXIDE:SCNC:PT:SER/PLAS:QN:: 28 MMOL/L (ref 21–31)
NON-UH HIE CHLORIDE:SCNC:PT:SER/PLAS:QN:: 107 MMOL/L (ref 101–111)
NON-UH HIE CREATININE:MCNC:PT:SER/PLAS:QN:: 0.7 MG/DL (ref 0.5–1.3)
NON-UH HIE EGFR: 84 ML/MIN/1.73 M2
NON-UH HIE ERYTHROCYTE DISTRIBUTION WIDTH:RATIO:PT:RBC:QN:AUTOMATED COUNT: 13 % (ref 10.9–14.2)
NON-UH HIE ERYTHROCYTE MEAN CORPUSCULAR HEMOGLOBIN CONCENTRATION:MCNC:PT:RB: 34.2 GM/DL (ref 31.4–36)
NON-UH HIE ERYTHROCYTE MEAN CORPUSCULAR HEMOGLOBIN:ENTMASS:PT:RBC:QN:AUTOMA: 31.4 PG (ref 27–34)
NON-UH HIE ERYTHROCYTE MEAN CORPUSCULAR VOLUME:ENTVOL:PT:RBC:QN:AUTOMATED C: 91.7 FL (ref 80–100)
NON-UH HIE ERYTHROCYTE SIZE:MORPH:PT:BLD:NOM:: NORMAL
NON-UH HIE ERYTHROCYTES:NCNC:PT:BLD:QN:AUTOMATED COUNT: 3.8 E12/L (ref 4.3–5.9)
NON-UH HIE GLUCOSE:MCNC:PT:SER/PLAS:QN:: 110 MG/DL (ref 55–199)
NON-UH HIE HEMATOCRIT:VFR:PT:BLD:QN:AUTOMATED COUNT: 34.9 % (ref 34–46)
NON-UH HIE HEMOGLOBIN:MCNC:PT:BLD:QN:: 11.9 GM/DL (ref 12–16)
NON-UH HIE LEUKOCYTES: 4.7 E9/L (ref 4–11)
NON-UH HIE PLATELET MEAN VOLUME:ENTVOL:PT:BLD:QN:AUTOMATED COUNT: 7.9 FL (ref 6.4–10.8)
NON-UH HIE PLATELETS:NCNC:PT:BLD:QN:AUTOMATED COUNT: 193 E9/L (ref 150–500)
NON-UH HIE POTASSIUM:SCNC:PT:SER/PLAS:QN:: 4.2 MMOL/L (ref 3.5–5.3)
NON-UH HIE SODIUM:SCNC:PT:SER/PLAS:QN:: 140 MMOL/L (ref 135–145)
NON-UH HIE UREA NITROGEN/CREATININE:MRTO:PT:SER/PLAS:QN:: 21 NO UNITS (ref 10–20)
NON-UH HIE UREA NITROGEN:MCNC:PT:SER/PLAS:QN:: 15 MG/DL (ref 5–21)

## 2025-05-14 PROCEDURE — 3078F DIAST BP <80 MM HG: CPT | Performed by: INTERNAL MEDICINE

## 2025-05-14 PROCEDURE — 1159F MED LIST DOCD IN RCRD: CPT | Performed by: INTERNAL MEDICINE

## 2025-05-14 PROCEDURE — 99214 OFFICE O/P EST MOD 30 MIN: CPT | Performed by: INTERNAL MEDICINE

## 2025-05-14 PROCEDURE — 3074F SYST BP LT 130 MM HG: CPT | Performed by: INTERNAL MEDICINE

## 2025-05-14 PROCEDURE — 1036F TOBACCO NON-USER: CPT | Performed by: INTERNAL MEDICINE

## 2025-05-14 RX ORDER — MULTIVIT-MIN/IRON/FOLIC ACID/K 18-600-40
500 CAPSULE ORAL 2 TIMES DAILY
COMMUNITY
Start: 2025-04-08

## 2025-05-14 RX ORDER — DILTIAZEM HYDROCHLORIDE 120 MG/1
120 CAPSULE, COATED, EXTENDED RELEASE ORAL DAILY
Qty: 90 CAPSULE | Refills: 3 | Status: SHIPPED | OUTPATIENT
Start: 2025-05-14 | End: 2026-05-14

## 2025-05-14 NOTE — PATIENT INSTRUCTIONS
Please bring all medicines, vitamins, and herbal supplements with you when you come to the office.    Prescriptions will not be filled unless you are compliant with your follow up appointments or have a follow up appointment scheduled as per instruction of your physician. Refills should be requested at the time of your visit.     Lab work  6 months

## 2025-05-14 NOTE — PROGRESS NOTES
HPI  Patient is in the office for follow-up for paroxysmal atrial fibrillation status post ablation with no recurrences.  She has had no further major bleeds, we had to reduce Plavix down to once weekly due to recurrent hematochezia from hemorrhoids.  This has stopped on the reduced dose.  She volunteers to work and maintains active lifestyle around her yard.  She has had no lower extremity edema orthopnea PND and has had no chest pain.  She has not had blood work since her last visit and a CBC plus basic body profile is ordered.  Her medical therapy has been well-tolerated will be left unchanged.    Assessment/recommendations:     1-history of atrial fibrillation status post radiofrequency ablation January 13, 2022 at [facility]. She was kept off of the anticoagulants due to significant GI bleed history ,no indication of recurrent atrial fibrillation.  2-severe pulmonary hypertension, RVSP 75 mmHg by echocardiogram February 2025 at [facility]. We will continue diuretics with potassium and  diltiazem 120 mg daily.  Patient turned down the option of using sildenafil.  No ascites, no lower extremity edema and no resting dyspnea  4-increased left atrial size 4.7 cm based on the Echocardiogram January 2023, this increases the risk of recurrent atrial fibrillation  5-coronary artery disease with 45-50% stenosis the mid anterior descending artery based on cardiac catheterization July 2024 at [facility] manage medically.  She has been on Plavix 75 mg daily along with a statin.  Due to recent severe anemia the Plavix dose to be reduced down to 75 mg weekly.  Regular follow-up on CBC was recommended  6-essential hypertension, currently under control.  7-severe iron deficiency anemia requiring blood transfusion and currently on iron supplements with negative GI workup.  CBC is ordered  Review of Systems   All other systems reviewed and are negative.           Vitals:    05/14/25 1059   BP: 124/60  "  BP Location: Left arm   Patient Position: Sitting   Pulse: 76   Weight: 54.9 kg (121 lb)   Height: 1.499 m (4' 11\")        Objective   Physical Exam  Constitutional:       Appearance: Normal appearance.   HENT:      Nose: Nose normal.   Neck:      Vascular: No carotid bruit.   Cardiovascular:      Rate and Rhythm: Normal rate.      Pulses: Normal pulses.      Heart sounds: Normal heart sounds.   Pulmonary:      Effort: Pulmonary effort is normal.   Abdominal:      General: Bowel sounds are normal.      Palpations: Abdomen is soft.   Musculoskeletal:         General: Normal range of motion.      Cervical back: Normal range of motion.      Right lower leg: No edema.      Left lower leg: No edema.   Skin:     General: Skin is warm and dry.   Neurological:      General: No focal deficit present.      Mental Status: She is alert.   Psychiatric:         Mood and Affect: Mood normal.         Behavior: Behavior normal.         Thought Content: Thought content normal.         Judgment: Judgment normal.         Allergies  Coricidin, Isosorbide mononitrate, Penicillins, and Spironolactone     Current Medications  Current Outpatient Medications   Medication Instructions    acetaminophen (TYLENOL 8 HOUR) 650 mg, As needed    ascorbic acid (vitamin C) 500 mg, 2 times daily    atorvastatin (LIPITOR) 20 mg, oral, Daily    calcium carbonate-vitamin D3 600 mg-5 mcg (200 unit) tablet 1 tablet, 2 times daily    clopidogrel (PLAVIX) 75 mg, Weekly    dilTIAZem CD (CARDIZEM CD) 120 mg, oral, Daily    ferrous sulfate 325 mg, 2 times daily    furosemide (LASIX) 20 mg, oral, Daily    nitroglycerin (NITROSTAT) 0.4 mg, sublingual, Every 5 min PRN, May repeat dose every 5 minutes for up to 3 doses total.    omeprazole OTC (PriLOSEC OTC) 20 mg EC tablet Take by mouth.    potassium chloride ER (Micro-K) 10 mEq ER capsule TAKE 1 CAPSULE(10 MEQ) BY MOUTH DAILY                        Assessment/Plan   1. Paroxysmal atrial fibrillation (Multi)  " Basic Metabolic Panel    CBC    dilTIAZem CD (Cardizem CD) 120 mg 24 hr capsule    Basic Metabolic Panel    CBC      2. Pulmonary hypertension (Multi)        3. Coronary artery disease involving native heart, unspecified vessel or lesion type, unspecified whether angina present  Basic Metabolic Panel    CBC    Basic Metabolic Panel    CBC      4. Essential hypertension  Follow Up In Cardiology    Follow Up In Cardiology    dilTIAZem CD (Cardizem CD) 120 mg 24 hr capsule      5. PVC (premature ventricular contraction)        6. Left atrial dilatation        7. Stage 3b chronic kidney disease (Multi)        8. BMI 24.0-24.9, adult        9. Former smoker                 Scribe Attestation  By signing my name below, IMarisol LPN   , Lucas   attest that this documentation has been prepared under the direction and in the presence of Jenny Moreno MD.     Provider Attestation - Scribe documentation    All medical record entries made by the Scribe were at my direction and personally dictated by me. I have reviewed the chart and agree that the record accurately reflects my personal performance of the history, physical exam, discussion and plan.

## 2025-05-14 NOTE — LETTER
May 14, 2025     Hamlet Bill MD  [address]  [facility] [id number]    Patient: Flaca Woody   YOB: 1940   Date of Visit: 5/14/2025       Dear Dr. Hamlet Bill MD:    Thank you for referring Flaca Woody to me for evaluation. Below are my notes for this consultation.  If you have questions, please do not hesitate to call me. I look forward to following your patient along with you.       Sincerely,     Jenny Moreno MD      CC: No Recipients  ______________________________________________________________________________________    HPI  Patient is in the office for follow-up for paroxysmal atrial fibrillation status post ablation with no recurrences.  She has had no further major bleeds, we had to reduce Plavix down to once weekly due to recurrent hematochezia from hemorrhoids.  This has stopped on the reduced dose.  She volunteers to work and maintains active lifestyle around her yard.  She has had no lower extremity edema orthopnea PND and has had no chest pain.  She has not had blood work since her last visit and a CBC plus basic body profile is ordered.  Her medical therapy has been well-tolerated will be left unchanged.    Assessment/recommendations:     1-history of atrial fibrillation status post radiofrequency ablation January 13, 2022 at [facility]. She was kept off of the anticoagulants due to significant GI bleed history ,no indication of recurrent atrial fibrillation.  2-severe pulmonary hypertension, RVSP 75 mmHg by echocardiogram February 2025 at [facility]. We will continue diuretics with potassium and  diltiazem 120 mg daily.  Patient turned down the option of using sildenafil.  No ascites, no lower extremity edema and no resting dyspnea  4-increased left atrial size 4.7 cm based on the Echocardiogram January 2023, this increases the risk of recurrent atrial fibrillation  5-coronary artery disease with 45-50% stenosis the mid anterior  "descending artery based on cardiac catheterization July 2024 at [facility] manage medically.  She has been on Plavix 75 mg daily along with a statin.  Due to recent severe anemia the Plavix dose to be reduced down to 75 mg weekly.  Regular follow-up on CBC was recommended  6-essential hypertension, currently under control.  7-severe iron deficiency anemia requiring blood transfusion and currently on iron supplements with negative GI workup.  CBC is ordered  Review of Systems   All other systems reviewed and are negative.           Vitals:    05/14/25 1059   BP: 124/60   BP Location: Left arm   Patient Position: Sitting   Pulse: 76   Weight: 54.9 kg (121 lb)   Height: 1.499 m (4' 11\")        Objective   Physical Exam  Constitutional:       Appearance: Normal appearance.   HENT:      Nose: Nose normal.   Neck:      Vascular: No carotid bruit.   Cardiovascular:      Rate and Rhythm: Normal rate.      Pulses: Normal pulses.      Heart sounds: Normal heart sounds.   Pulmonary:      Effort: Pulmonary effort is normal.   Abdominal:      General: Bowel sounds are normal.      Palpations: Abdomen is soft.   Musculoskeletal:         General: Normal range of motion.      Cervical back: Normal range of motion.      Right lower leg: No edema.      Left lower leg: No edema.   Skin:     General: Skin is warm and dry.   Neurological:      General: No focal deficit present.      Mental Status: She is alert.   Psychiatric:         Mood and Affect: Mood normal.         Behavior: Behavior normal.         Thought Content: Thought content normal.         Judgment: Judgment normal.         Allergies  Coricidin, Isosorbide mononitrate, Penicillins, and Spironolactone     Current Medications  Current Outpatient Medications   Medication Instructions   • acetaminophen (TYLENOL 8 HOUR) 650 mg, As needed   • ascorbic acid (vitamin C) 500 mg, 2 times daily   • atorvastatin (LIPITOR) 20 mg, oral, Daily   • calcium " carbonate-vitamin D3 600 mg-5 mcg (200 unit) tablet 1 tablet, 2 times daily   • clopidogrel (PLAVIX) 75 mg, Weekly   • dilTIAZem CD (CARDIZEM CD) 120 mg, oral, Daily   • ferrous sulfate 325 mg, 2 times daily   • furosemide (LASIX) 20 mg, oral, Daily   • nitroglycerin (NITROSTAT) 0.4 mg, sublingual, Every 5 min PRN, May repeat dose every 5 minutes for up to 3 doses total.   • omeprazole OTC (PriLOSEC OTC) 20 mg EC tablet Take by mouth.   • potassium chloride ER (Micro-K) 10 mEq ER capsule TAKE 1 CAPSULE(10 MEQ) BY MOUTH DAILY                        Assessment/Plan   1. Paroxysmal atrial fibrillation (Multi)  Basic Metabolic Panel    CBC    dilTIAZem CD (Cardizem CD) 120 mg 24 hr capsule    Basic Metabolic Panel    CBC      2. Pulmonary hypertension (Multi)        3. Coronary artery disease involving native heart, unspecified vessel or lesion type, unspecified whether angina present  Basic Metabolic Panel    CBC    Basic Metabolic Panel    CBC      4. Essential hypertension  Follow Up In Cardiology    Follow Up In Cardiology    dilTIAZem CD (Cardizem CD) 120 mg 24 hr capsule      5. PVC (premature ventricular contraction)        6. Left atrial dilatation        7. Stage 3b chronic kidney disease (Multi)        8. BMI 24.0-24.9, adult        9. Former smoker                 Scribe Attestation  By signing my name below, IMarisol LPN, Scribe   attest that this documentation has been prepared under the direction and in the presence of Jenny Moreno MD.     Provider Attestation - Scribe documentation    All medical record entries made by the Scribe were at my direction and personally dictated by me. I have reviewed the chart and agree that the record accurately reflects my personal performance of the history, physical exam, discussion and plan.

## 2025-06-20 ENCOUNTER — TELEPHONE (OUTPATIENT)
Dept: CARDIOLOGY | Facility: CLINIC | Age: 85
End: 2025-06-20
Payer: MEDICARE

## 2025-06-20 NOTE — TELEPHONE ENCOUNTER
Message from patient stating she stopped her Lasix and potassium one week ago due to excessive urination, and skin dryness/irritation.

## 2025-10-03 ENCOUNTER — APPOINTMENT (OUTPATIENT)
Dept: CARDIOLOGY | Facility: CLINIC | Age: 85
End: 2025-10-03
Payer: MEDICARE

## 2026-01-21 ENCOUNTER — APPOINTMENT (OUTPATIENT)
Dept: CARDIOLOGY | Facility: CLINIC | Age: 86
End: 2026-01-21
Payer: MEDICARE